# Patient Record
Sex: MALE | Race: WHITE | NOT HISPANIC OR LATINO | Employment: FULL TIME | URBAN - METROPOLITAN AREA
[De-identification: names, ages, dates, MRNs, and addresses within clinical notes are randomized per-mention and may not be internally consistent; named-entity substitution may affect disease eponyms.]

---

## 2024-01-04 ENCOUNTER — OFFICE VISIT (OUTPATIENT)
Dept: INTERNAL MEDICINE | Facility: CLINIC | Age: 24
End: 2024-01-04
Payer: COMMERCIAL

## 2024-01-04 VITALS
HEART RATE: 69 BPM | BODY MASS INDEX: 25.19 KG/M2 | SYSTOLIC BLOOD PRESSURE: 132 MMHG | DIASTOLIC BLOOD PRESSURE: 75 MMHG | HEIGHT: 74 IN | WEIGHT: 196.3 LBS | OXYGEN SATURATION: 100 %

## 2024-01-04 DIAGNOSIS — F90.2 ATTENTION DEFICIT HYPERACTIVITY DISORDER (ADHD), COMBINED TYPE: Primary | ICD-10-CM

## 2024-01-04 PROCEDURE — 99203 OFFICE O/P NEW LOW 30 MIN: CPT | Performed by: HOSPITALIST

## 2024-01-04 RX ORDER — DEXTROAMPHETAMINE SACCHARATE, AMPHETAMINE ASPARTATE, DEXTROAMPHETAMINE SULFATE AND AMPHETAMINE SULFATE 2.5; 2.5; 2.5; 2.5 MG/1; MG/1; MG/1; MG/1
1 TABLET ORAL
COMMUNITY
Start: 2023-11-16 | End: 2024-01-04

## 2024-01-04 RX ORDER — DEXTROAMPHETAMINE SACCHARATE, AMPHETAMINE ASPARTATE MONOHYDRATE, DEXTROAMPHETAMINE SULFATE AND AMPHETAMINE SULFATE 5; 5; 5; 5 MG/1; MG/1; MG/1; MG/1
20 CAPSULE, EXTENDED RELEASE ORAL DAILY
Qty: 30 CAPSULE | Refills: 0 | Status: SHIPPED | OUTPATIENT
Start: 2024-01-04 | End: 2024-01-27

## 2024-01-04 RX ORDER — LISDEXAMFETAMINE DIMESYLATE 40 MG/1
40 CAPSULE ORAL EVERY MORNING
COMMUNITY
Start: 2023-11-16 | End: 2024-01-04

## 2024-01-04 RX ORDER — DEXTROAMPHETAMINE SACCHARATE, AMPHETAMINE ASPARTATE, DEXTROAMPHETAMINE SULFATE AND AMPHETAMINE SULFATE 2.5; 2.5; 2.5; 2.5 MG/1; MG/1; MG/1; MG/1
10 TABLET ORAL DAILY PRN
Qty: 30 TABLET | Refills: 0 | Status: SHIPPED | OUTPATIENT
Start: 2024-01-04 | End: 2024-01-27

## 2024-01-04 NOTE — PROGRESS NOTES
Assessment/Plan  Problem List Items Addressed This Visit       Attention deficit hyperactivity disorder (ADHD), combined type - Primary     - ADAMARIS from last PCP in [place].   - Will change medication to Adderall XL 20mg daily and Adderall 10mg as needed during the day.   - Discontinue Vyvanse 40mg daily.  - Message back in 1-2 weeks if beneficial. May consider increased dose if slightly beneficial vs continuing back on Vyvanse.          Relevant Medications    amphetamine-dextroamphetamine (ADDERALL) 10 MG tablet    amphetamine-dextroamphetamine (ADDERALL XR) 20 MG 24 hr capsule       No results found for any visits on 01/04/24.    There are no preventive care reminders to display for this patient.      Subjective  Here to establish care. Moved here about 6 months ago from [place]. Was getting refills for meds from his prior PCP from [place].     Mentions when he was in college, he would visit his PCP on his breaks. Mentions he was diagnosed at age 17 for ADHD, combined type. Vyvanse was at 50mg before but now is on 40mg daily, Adderall 10mg as needed. Mentions he would like to discuss possibly. Does not sleep well but is able to get to sleep when he needs. 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 Mentions he has been on Vyvanse for about 6 years. Last 18 months has been on Adderall as needed. No current issues right now.                 Review of Systems   Constitutional:  Negative for chills and fever.   Respiratory:  Negative for shortness of breath.    Cardiovascular:  Negative for chest pain.   Gastrointestinal:  Negative for abdominal pain, constipation, diarrhea and nausea.   Genitourinary:  Negative for dysuria and frequency.   Musculoskeletal:  Negative for arthralgias.   Psychiatric/Behavioral:  Negative for agitation and mood changes.        History  Past Medical History:   Diagnosis Date    ADHD (attention deficit hyperactivity disorder), combined type        Past Surgical History:   Procedure  "Laterality Date    DRESS/DEBRID MED BURN NO ANESTH      right temple with Staph infection as child    SURGICAL PATHOLOGY EXAM      tooth    TONSILLECTOMY      XR FINGER SURGERY VANESSA LEFT      XR FOOT SURGERY VANESSA LEFT      OM at child       Family History   Problem Relation Age of Onset    Lymphoma Sister         Non Hodgkin's    Dementia Maternal Grandfather 80        Lewy body    Coronary Artery Disease Paternal Grandfather         required a stent       Social History     Tobacco Use    Smoking status: Never    Smokeless tobacco: Never   Substance Use Topics    Alcohol use: Not on file        Objective  /75 (BP Location: Right arm, Patient Position: Sitting, Cuff Size: Adult Regular)   Pulse 69   Ht 1.87 m (6' 1.62\")   Wt 89 kg (196 lb 4.8 oz)   SpO2 100%   BMI 25.46 kg/m    Vitals taken by Phillip Mendez MD    Physical Exam  Constitutional:       General: He is not in acute distress.     Appearance: He is not ill-appearing or toxic-appearing.   HENT:      Head: Normocephalic.   Eyes:      Conjunctiva/sclera: Conjunctivae normal.   Cardiovascular:      Rate and Rhythm: Regular rhythm.      Heart sounds: Normal heart sounds. No murmur heard.     No friction rub. No gallop.   Pulmonary:      Effort: Pulmonary effort is normal. No respiratory distress.      Breath sounds: Normal breath sounds. No wheezing, rhonchi or rales.   Musculoskeletal:      Right lower leg: No edema.      Left lower leg: No edema.   Skin:     General: Skin is warm and dry.      Findings: No rash.   Neurological:      Mental Status: He is alert.   Psychiatric:         Mood and Affect: Mood normal.         Thought Content: Thought content normal.         25 minutes spent on the date of the encounter doing chart review, history and exam, documentation and further activities per the note.      Return in about 2 months (around 3/4/2024).      Phillip Mendez MD  [facility] [place]    "

## 2024-01-04 NOTE — PROGRESS NOTES
Emile is a 23 year old that presents in clinic today for the following:     Chief Complaint   Patient presents with    Establish Care    Refill Request           1/4/2024    11:54 AM   Additional Questions   Roomed by Anitha Mccain   Accompanied by N/A       Screenings as of today     PHQ-2 Total Score (Adult) - Positive if 3 or more points; Administer   PHQ-9 if positive 0        Anitha Mccain at 12:03 PM on 1/4/2024

## 2024-01-04 NOTE — PATIENT INSTRUCTIONS
- ADAMARIS from last PCP in [place].   - Will change medication to Adderall XL 20mg daily and Adderall 10mg as needed during the day.   - Message back in 1-2 weeks if beneficial.   - Help to set up Mychart for patient.     Follow up again in 2 months by video or inperson.

## 2024-01-06 PROBLEM — F90.2 ATTENTION DEFICIT HYPERACTIVITY DISORDER (ADHD), COMBINED TYPE: Status: ACTIVE | Noted: 2024-01-06

## 2024-01-06 ASSESSMENT — ENCOUNTER SYMPTOMS
FREQUENCY: 0
AGITATION: 0
SHORTNESS OF BREATH: 0
FEVER: 0
ABDOMINAL PAIN: 0
ARTHRALGIAS: 0
CONSTIPATION: 0
DYSURIA: 0
DIARRHEA: 0
NAUSEA: 0
CHILLS: 0

## 2024-01-06 NOTE — ASSESSMENT & PLAN NOTE
- ADAMARIS from last PCP in [place].   - Will change medication to Adderall XL 20mg daily and Adderall 10mg as needed during the day.   - Discontinue Vyvanse 40mg daily.  - Message back in 1-2 weeks if beneficial. May consider increased dose if slightly beneficial vs continuing back on Vyvanse.

## 2024-03-10 ENCOUNTER — HEALTH MAINTENANCE LETTER (OUTPATIENT)
Age: 24
End: 2024-03-10

## 2024-03-11 ENCOUNTER — TELEPHONE (OUTPATIENT)
Dept: INTERNAL MEDICINE | Facility: CLINIC | Age: 24
End: 2024-03-11

## 2024-03-11 ENCOUNTER — VIRTUAL VISIT (OUTPATIENT)
Dept: INTERNAL MEDICINE | Facility: CLINIC | Age: 24
End: 2024-03-11
Payer: COMMERCIAL

## 2024-03-11 DIAGNOSIS — F90.2 ATTENTION DEFICIT HYPERACTIVITY DISORDER (ADHD), COMBINED TYPE: Primary | ICD-10-CM

## 2024-03-11 PROCEDURE — 99213 OFFICE O/P EST LOW 20 MIN: CPT | Mod: 95 | Performed by: HOSPITALIST

## 2024-03-11 RX ORDER — DEXTROAMPHETAMINE SACCHARATE, AMPHETAMINE ASPARTATE, DEXTROAMPHETAMINE SULFATE AND AMPHETAMINE SULFATE 2.5; 2.5; 2.5; 2.5 MG/1; MG/1; MG/1; MG/1
10 TABLET ORAL DAILY PRN
Qty: 30 TABLET | Refills: 0 | Status: SHIPPED | OUTPATIENT
Start: 2024-03-11 | End: 2024-04-29

## 2024-03-11 RX ORDER — DEXTROAMPHETAMINE SACCHARATE, AMPHETAMINE ASPARTATE, DEXTROAMPHETAMINE SULFATE AND AMPHETAMINE SULFATE 2.5; 2.5; 2.5; 2.5 MG/1; MG/1; MG/1; MG/1
10 TABLET ORAL DAILY PRN
Qty: 30 TABLET | Refills: 0 | Status: SHIPPED | OUTPATIENT
Start: 2024-05-11 | End: 2024-08-19

## 2024-03-11 RX ORDER — DEXTROAMPHETAMINE SACCHARATE, AMPHETAMINE ASPARTATE, DEXTROAMPHETAMINE SULFATE AND AMPHETAMINE SULFATE 2.5; 2.5; 2.5; 2.5 MG/1; MG/1; MG/1; MG/1
10 TABLET ORAL DAILY PRN
Qty: 30 TABLET | Refills: 0 | Status: SHIPPED | OUTPATIENT
Start: 2024-04-11 | End: 2024-08-19

## 2024-03-11 RX ORDER — DEXTROAMPHETAMINE SACCHARATE, AMPHETAMINE ASPARTATE MONOHYDRATE, DEXTROAMPHETAMINE SULFATE AND AMPHETAMINE SULFATE 5; 5; 5; 5 MG/1; MG/1; MG/1; MG/1
20 CAPSULE, EXTENDED RELEASE ORAL DAILY
Qty: 30 CAPSULE | Refills: 0 | Status: SHIPPED | OUTPATIENT
Start: 2024-03-11 | End: 2024-04-29

## 2024-03-11 RX ORDER — DEXTROAMPHETAMINE SACCHARATE, AMPHETAMINE ASPARTATE MONOHYDRATE, DEXTROAMPHETAMINE SULFATE AND AMPHETAMINE SULFATE 5; 5; 5; 5 MG/1; MG/1; MG/1; MG/1
20 CAPSULE, EXTENDED RELEASE ORAL DAILY
Qty: 30 CAPSULE | Refills: 0 | Status: SHIPPED | OUTPATIENT
Start: 2024-04-11 | End: 2024-08-19

## 2024-03-11 RX ORDER — DEXTROAMPHETAMINE SACCHARATE, AMPHETAMINE ASPARTATE MONOHYDRATE, DEXTROAMPHETAMINE SULFATE AND AMPHETAMINE SULFATE 5; 5; 5; 5 MG/1; MG/1; MG/1; MG/1
20 CAPSULE, EXTENDED RELEASE ORAL DAILY
Qty: 30 CAPSULE | Refills: 0 | Status: SHIPPED | OUTPATIENT
Start: 2024-05-11 | End: 2024-08-19

## 2024-03-11 NOTE — PROGRESS NOTES
"Emile is a 23 year old who is being evaluated via a billable video visit.      How would you like to obtain your AVS? MyChart  If the video visit is dropped, the invitation should be resent by: Text to cell phone: [phone]  Will anyone else be joining your video visit? No          Assessment & Plan   Problem List Items Addressed This Visit       Attention deficit hyperactivity disorder (ADHD), combined type - Primary     - Continued on Adderall XL 20mg daily, Adderall 10mg daily prn.              4 minutes spent by me on the date of the encounter doing chart review, history and exam, documentation and further activities per the note     BMI  Estimated body mass index is 25.46 kg/m  as calculated from the following:    Height as of 1/4/24: 1.87 m (6' 1.62\").    Weight as of 1/4/24: 89 kg (196 lb 4.8 oz).     Return in about 5 months (around 8/11/2024).      Subjective   Emile is a 23 year old, presenting for the following health issues:  RECHECK    Time in: 7:33am  Time out: 7:37am    History of Present Illness       Reason for visit:  ADHD    He eats 2-3 servings of fruits and vegetables daily.He consumes 1 sweetened beverage(s) daily.He exercises with enough effort to increase his heart rate 30 to 60 minutes per day.  He exercises with enough effort to increase his heart rate 4 days per week. He is missing 2 dose(s) of medications per week.  He is not taking prescribed medications regularly due to side effects and other.     Patient mentions he is doing well with taking Adderall 20mg XL daily and Adderall immediate release 10mg as needed. He denies any issues such has palpitations, chest pains, shortness of breath, bowel or urine changes. No mood changes while on the medication. He mentions usually taking the XL dosing at least 5 days out of the week and 10mg dose at least 4 times a week.       Review of Systems  Constitutional, neuro, ENT, endocrine, pulmonary, cardiac, gastrointestinal, genitourinary, " musculoskeletal, integument and psychiatric systems are negative, except as otherwise noted.      Objective           Vitals:  No vitals were obtained today due to virtual visit.    Physical Exam   GENERAL: alert and no distress  EYES: Eyes grossly normal to inspection.  No discharge or erythema, or obvious scleral/conjunctival abnormalities.  RESP: no respiratory distress  SKIN: Visible skin clear. No significant rash, abnormal pigmentation or lesions.  PSYCH: Appropriate affect, tone, and pace of words          Video-Visit Details    Type of service:  Video Visit     Originating Location (pt. Location): Home    Distant Location (provider location):  On-site  Platform used for Video Visit: Jacque  Signed Electronically by: Phillip Mendez MD

## 2024-03-11 NOTE — PATIENT INSTRUCTIONS
Thank you for visiting the Primary Care Center today at the [facility]! The following is some information about our clinic:     Continue current Adderall regimen.     Follow up again in 4-6 months.     Primary Care Center Frequently-Asked Questions    (1) How do I schedule appointments at the [facility]?     Primary Care--to schedule or make changes to an existing appointment, please call our primary care line at [phone].    Labs--to schedule a lab appointment at the [facility] you can use [employer] or call [phone]. If you have a [place] location that is closer to home, you can reach out to that location for scheduling options.     Imaging--if you need to schedule a CT, X-ray, MRI, ultrasound, or other imaging study you can call [phone] to schedule at the [facility] or any other [facility] [place] imaging location.     Referrals--if a referral to another specialty was ordered you can expect a phone call from their scheduling team. If you have not heard from them in a week, please call us or send us a [employer] message to check the status or get a scheduling number. Please note that this only applies to internal [facility] [place] referrals. If the referral is external you would need to contact their office for scheduling.     (2) I have a question about my visit, who do I contact?     You can call us at the primary care line at [phone] to ask questions about your visit. You can also send a secure message through [employer], which is reviewed by clinic staff. Please note that [employer] messages have a twenty-four to forty-eight business hour turnaround time and should not be used for urgent concerns.    (3) How will I get the results of my tests?    If you are signed up for [employer] all tests will be released to you within twenty-four hours of resulting. Please allow three to five days for your doctor to review your results and place a note  interpreting the results. If you do not have [employer] you will receive your results through mail seven to ten business days following the return of the tests. Please note that if there should be any urgent or concerning results that your doctor or their registered nurse will reach out to you the same day as the tests come back. If you have follow up questions about your results or would like to discuss the results in detail please schedule a follow up with your provider either in person or virtually.     (4) How do I get refills of my prescriptions?     You should always first contact your pharmacy for refills of your medications. If submitting a refill request on [employer], please be sure to submit the request only once--repeat requests can cause delays in refill. If you are requesting a NEW medication or a medication related to new symptoms you will need to schedule an appointment with a provider prior to approval. Please note: Routine medication refills have up to one to three business day turnaround whereas controlled substances refills have up to five to seven business day turnaround.    (5) I have new symptoms, what do I do?     If you are having an immediate medical emergency, you should dial 911 for assistance.   For anything urgent that needs to be seen within a few hours to one day you should visit a local urgent care for assistance.  For non-urgent symptoms that need to be seen within a few days to a week you can schedule with an available provider in primary care by going to [employer] or calling [phone].   If you are not sure how serious your symptoms are or you would like to receive medical advice you can always call [phone] to speak with a triage nurse.

## 2024-03-11 NOTE — TELEPHONE ENCOUNTER
Left Voicemail (1st Attempt) and Sent Mychart (1st Attempt) for the patient to call back and schedule the following:    Appointment type: [facility] RTN  Provider: PCP  Return date: 8/11    Additonal Notes: 5 month follow up

## 2024-03-15 ENCOUNTER — TELEPHONE (OUTPATIENT)
Dept: INTERNAL MEDICINE | Facility: CLINIC | Age: 24
End: 2024-03-15
Payer: COMMERCIAL

## 2024-03-15 NOTE — TELEPHONE ENCOUNTER
Left Voicemail (2nd Attempt) for the patient to call back and schedule the following:    Appointment type: P RETURN  Provider: PCP  Return date: 8/12

## 2024-03-15 NOTE — TELEPHONE ENCOUNTER
----- Message from Janette Cramer sent at 3/11/2024  1:08 PM CDT -----  Schedule 5 monnth follow up with PCP due 8/11    LVM and myc 3/11

## 2024-04-29 ENCOUNTER — MYC REFILL (OUTPATIENT)
Dept: INTERNAL MEDICINE | Facility: CLINIC | Age: 24
End: 2024-04-29
Payer: COMMERCIAL

## 2024-04-29 DIAGNOSIS — F90.2 ATTENTION DEFICIT HYPERACTIVITY DISORDER (ADHD), COMBINED TYPE: ICD-10-CM

## 2024-04-29 RX ORDER — DEXTROAMPHETAMINE SACCHARATE, AMPHETAMINE ASPARTATE, DEXTROAMPHETAMINE SULFATE AND AMPHETAMINE SULFATE 2.5; 2.5; 2.5; 2.5 MG/1; MG/1; MG/1; MG/1
10 TABLET ORAL DAILY PRN
Qty: 30 TABLET | Refills: 0 | Status: SHIPPED | OUTPATIENT
Start: 2024-04-29 | End: 2024-08-19

## 2024-04-29 RX ORDER — DEXTROAMPHETAMINE SACCHARATE, AMPHETAMINE ASPARTATE MONOHYDRATE, DEXTROAMPHETAMINE SULFATE AND AMPHETAMINE SULFATE 5; 5; 5; 5 MG/1; MG/1; MG/1; MG/1
20 CAPSULE, EXTENDED RELEASE ORAL DAILY
Qty: 30 CAPSULE | Refills: 0 | Status: SHIPPED | OUTPATIENT
Start: 2024-04-29 | End: 2024-08-19

## 2024-04-29 NOTE — TELEPHONE ENCOUNTER
Covering for PCP/ordering provider (out of clinic today):  One refill signed.  Further refills per PCP.    Thanks,  Jarad Saxena MD

## 2024-05-31 ENCOUNTER — MYC REFILL (OUTPATIENT)
Dept: INTERNAL MEDICINE | Facility: CLINIC | Age: 24
End: 2024-05-31
Payer: COMMERCIAL

## 2024-05-31 DIAGNOSIS — F90.2 ATTENTION DEFICIT HYPERACTIVITY DISORDER (ADHD), COMBINED TYPE: ICD-10-CM

## 2024-05-31 RX ORDER — DEXTROAMPHETAMINE SACCHARATE, AMPHETAMINE ASPARTATE, DEXTROAMPHETAMINE SULFATE AND AMPHETAMINE SULFATE 2.5; 2.5; 2.5; 2.5 MG/1; MG/1; MG/1; MG/1
10 TABLET ORAL DAILY PRN
Qty: 30 TABLET | Refills: 0 | Status: CANCELLED | OUTPATIENT
Start: 2024-05-31

## 2024-05-31 RX ORDER — DEXTROAMPHETAMINE SACCHARATE, AMPHETAMINE ASPARTATE MONOHYDRATE, DEXTROAMPHETAMINE SULFATE AND AMPHETAMINE SULFATE 5; 5; 5; 5 MG/1; MG/1; MG/1; MG/1
20 CAPSULE, EXTENDED RELEASE ORAL DAILY
Qty: 30 CAPSULE | Refills: 0 | Status: CANCELLED | OUTPATIENT
Start: 2024-05-31

## 2024-06-03 ENCOUNTER — MYC REFILL (OUTPATIENT)
Dept: INTERNAL MEDICINE | Facility: CLINIC | Age: 24
End: 2024-06-03
Payer: COMMERCIAL

## 2024-06-03 DIAGNOSIS — F90.2 ATTENTION DEFICIT HYPERACTIVITY DISORDER (ADHD), COMBINED TYPE: ICD-10-CM

## 2024-06-03 RX ORDER — DEXTROAMPHETAMINE SACCHARATE, AMPHETAMINE ASPARTATE, DEXTROAMPHETAMINE SULFATE AND AMPHETAMINE SULFATE 2.5; 2.5; 2.5; 2.5 MG/1; MG/1; MG/1; MG/1
10 TABLET ORAL DAILY PRN
Qty: 30 TABLET | Refills: 0 | Status: CANCELLED | OUTPATIENT
Start: 2024-06-03

## 2024-06-03 RX ORDER — DEXTROAMPHETAMINE SACCHARATE, AMPHETAMINE ASPARTATE MONOHYDRATE, DEXTROAMPHETAMINE SULFATE AND AMPHETAMINE SULFATE 5; 5; 5; 5 MG/1; MG/1; MG/1; MG/1
20 CAPSULE, EXTENDED RELEASE ORAL DAILY
Qty: 30 CAPSULE | Refills: 0 | Status: CANCELLED | OUTPATIENT
Start: 2024-06-03

## 2024-06-04 ENCOUNTER — MYC MEDICAL ADVICE (OUTPATIENT)
Dept: INTERNAL MEDICINE | Facility: CLINIC | Age: 24
End: 2024-06-04
Payer: COMMERCIAL

## 2024-08-19 ENCOUNTER — MYC REFILL (OUTPATIENT)
Dept: INTERNAL MEDICINE | Facility: CLINIC | Age: 24
End: 2024-08-19
Payer: COMMERCIAL

## 2024-08-19 DIAGNOSIS — F90.2 ATTENTION DEFICIT HYPERACTIVITY DISORDER (ADHD), COMBINED TYPE: ICD-10-CM

## 2024-08-19 RX ORDER — DEXTROAMPHETAMINE SACCHARATE, AMPHETAMINE ASPARTATE, DEXTROAMPHETAMINE SULFATE AND AMPHETAMINE SULFATE 2.5; 2.5; 2.5; 2.5 MG/1; MG/1; MG/1; MG/1
10 TABLET ORAL DAILY PRN
Qty: 30 TABLET | Refills: 0 | Status: SHIPPED | OUTPATIENT
Start: 2024-08-19

## 2024-08-19 RX ORDER — DEXTROAMPHETAMINE SACCHARATE, AMPHETAMINE ASPARTATE MONOHYDRATE, DEXTROAMPHETAMINE SULFATE AND AMPHETAMINE SULFATE 5; 5; 5; 5 MG/1; MG/1; MG/1; MG/1
20 CAPSULE, EXTENDED RELEASE ORAL DAILY
Qty: 30 CAPSULE | Refills: 0 | Status: SHIPPED | OUTPATIENT
Start: 2024-08-19

## 2024-11-17 ENCOUNTER — MYC REFILL (OUTPATIENT)
Dept: INTERNAL MEDICINE | Facility: CLINIC | Age: 24
End: 2024-11-17
Payer: COMMERCIAL

## 2024-11-17 DIAGNOSIS — F90.2 ATTENTION DEFICIT HYPERACTIVITY DISORDER (ADHD), COMBINED TYPE: ICD-10-CM

## 2024-11-17 RX ORDER — DEXTROAMPHETAMINE SACCHARATE, AMPHETAMINE ASPARTATE MONOHYDRATE, DEXTROAMPHETAMINE SULFATE AND AMPHETAMINE SULFATE 5; 5; 5; 5 MG/1; MG/1; MG/1; MG/1
20 CAPSULE, EXTENDED RELEASE ORAL DAILY
Qty: 30 CAPSULE | Refills: 0 | Status: CANCELLED | OUTPATIENT
Start: 2024-11-17

## 2024-11-18 RX ORDER — DEXTROAMPHETAMINE SACCHARATE, AMPHETAMINE ASPARTATE, DEXTROAMPHETAMINE SULFATE AND AMPHETAMINE SULFATE 2.5; 2.5; 2.5; 2.5 MG/1; MG/1; MG/1; MG/1
10 TABLET ORAL DAILY PRN
Qty: 30 TABLET | Refills: 0 | Status: SHIPPED | OUTPATIENT
Start: 2024-11-18

## 2024-11-18 RX ORDER — DEXTROAMPHETAMINE SACCHARATE, AMPHETAMINE ASPARTATE, DEXTROAMPHETAMINE SULFATE AND AMPHETAMINE SULFATE 2.5; 2.5; 2.5; 2.5 MG/1; MG/1; MG/1; MG/1
10 TABLET ORAL 2 TIMES DAILY
Qty: 90 TABLET | Refills: 0 | Status: SHIPPED | OUTPATIENT
Start: 2024-11-18

## 2024-12-13 ENCOUNTER — LAB (OUTPATIENT)
Dept: LAB | Facility: CLINIC | Age: 24
End: 2024-12-13
Payer: COMMERCIAL

## 2024-12-13 DIAGNOSIS — Z13.6 CARDIOVASCULAR SCREENING; LDL GOAL LESS THAN 160: ICD-10-CM

## 2024-12-13 LAB
ALBUMIN SERPL BCG-MCNC: 4.7 G/DL (ref 3.5–5.2)
ALP SERPL-CCNC: 66 U/L (ref 40–150)
ALT SERPL W P-5'-P-CCNC: 37 U/L (ref 0–70)
ANION GAP SERPL CALCULATED.3IONS-SCNC: 10 MMOL/L (ref 7–15)
AST SERPL W P-5'-P-CCNC: 28 U/L (ref 0–45)
BILIRUB SERPL-MCNC: 0.6 MG/DL
BUN SERPL-MCNC: 18.3 MG/DL (ref 6–20)
CALCIUM SERPL-MCNC: 9.8 MG/DL (ref 8.8–10.4)
CHLORIDE SERPL-SCNC: 103 MMOL/L (ref 98–107)
CHOLEST SERPL-MCNC: 180 MG/DL
CREAT SERPL-MCNC: 1.08 MG/DL (ref 0.67–1.17)
EGFRCR SERPLBLD CKD-EPI 2021: >90 ML/MIN/1.73M2
FASTING STATUS PATIENT QL REPORTED: YES
FASTING STATUS PATIENT QL REPORTED: YES
GLUCOSE SERPL-MCNC: 92 MG/DL (ref 70–99)
HCO3 SERPL-SCNC: 27 MMOL/L (ref 22–29)
HDLC SERPL-MCNC: 46 MG/DL
LDLC SERPL CALC-MCNC: 116 MG/DL
NONHDLC SERPL-MCNC: 134 MG/DL
POTASSIUM SERPL-SCNC: 4.6 MMOL/L (ref 3.4–5.3)
PROT SERPL-MCNC: 7.4 G/DL (ref 6.4–8.3)
SODIUM SERPL-SCNC: 140 MMOL/L (ref 135–145)
TRIGL SERPL-MCNC: 91 MG/DL

## 2024-12-13 PROCEDURE — 80061 LIPID PANEL: CPT | Performed by: PATHOLOGY

## 2024-12-13 PROCEDURE — 36415 COLL VENOUS BLD VENIPUNCTURE: CPT | Performed by: PATHOLOGY

## 2024-12-13 PROCEDURE — 80053 COMPREHEN METABOLIC PANEL: CPT | Performed by: PATHOLOGY

## 2025-01-13 ENCOUNTER — MYC REFILL (OUTPATIENT)
Dept: INTERNAL MEDICINE | Facility: CLINIC | Age: 25
End: 2025-01-13
Payer: COMMERCIAL

## 2025-01-13 DIAGNOSIS — F90.2 ATTENTION DEFICIT HYPERACTIVITY DISORDER (ADHD), COMBINED TYPE: ICD-10-CM

## 2025-01-13 RX ORDER — DEXTROAMPHETAMINE SACCHARATE, AMPHETAMINE ASPARTATE, DEXTROAMPHETAMINE SULFATE AND AMPHETAMINE SULFATE 2.5; 2.5; 2.5; 2.5 MG/1; MG/1; MG/1; MG/1
10 TABLET ORAL DAILY PRN
Qty: 30 TABLET | Refills: 0 | Status: SHIPPED | OUTPATIENT
Start: 2025-01-13

## 2025-01-13 RX ORDER — DEXTROAMPHETAMINE SACCHARATE, AMPHETAMINE ASPARTATE MONOHYDRATE, DEXTROAMPHETAMINE SULFATE AND AMPHETAMINE SULFATE 5; 5; 5; 5 MG/1; MG/1; MG/1; MG/1
20 CAPSULE, EXTENDED RELEASE ORAL DAILY
Qty: 30 CAPSULE | Refills: 0 | Status: SHIPPED | OUTPATIENT
Start: 2025-01-13

## 2025-01-13 NOTE — TELEPHONE ENCOUNTER
Controlled substance refill request notes    Refill request received for: Dextroamp-Amphet Er 20 Mg Cap  [place]  data reviewed 01/13/25:  Medication last refill: 30 tab, 30 day supply, filled/sold to patient on 12/10/2024  Pended order: Dextroamp-Amphet Er 20 Mg Cap, 30 tab, 30 day supply, no delay in fill date     Primary care provider: Phillip Mendez  Last office visit this department: 12/5/2024  Last virtual visit this department: 3/11/2024  Next appointment with PCP: 06/09/2025    Refill request forwarded to provider for review.     Opal CHAKRABORTY LPN  [facility]

## 2025-03-31 ENCOUNTER — MYC REFILL (OUTPATIENT)
Dept: INTERNAL MEDICINE | Facility: CLINIC | Age: 25
End: 2025-03-31
Payer: COMMERCIAL

## 2025-03-31 DIAGNOSIS — F90.2 ATTENTION DEFICIT HYPERACTIVITY DISORDER (ADHD), COMBINED TYPE: ICD-10-CM

## 2025-03-31 NOTE — TELEPHONE ENCOUNTER
Controlled substance refill request notes    Refill request received for: Dextroamp-Amphetamin 10 Mg Tab  [place] reviewed 03/31/25:  Medication last refill: 30 tab, 30 day supply, filled/sold to patient on 02/26/2025  Pended order: Dextroamp-Amphetamin 10 Mg Tab, 30 tab, 30 day supply, no delay in fill date     Primary care provider: Phillip Mendez  Last office visit this department: 12/5/2024  Last virtual visit this department: 3/11/2024  Next appointment with PCP:   Future Appointments 3/31/2025 - 9/27/2025        Date Visit Type Length Department Provider     6/9/2025  8:30 AM UMP RETURN 30 min [facility] Phillip Mendez MD    Location Instructions:     Due to [place] [address], travel times to this location may be longer than usual. Please plan for extra travel time and check the [place] Department of Transportation [address] [place] website for delay, closure, and detour information.  The [facility] ([facility]) is in a dense urban area with multiple transportation and parking options. You may wish to review options for  service and self-parking in more detail on the [facility] s website at [email or web address].                   Refill request forwarded to provider for review.     Opal CHAKRABORTY LPN  [facility]

## 2025-04-01 RX ORDER — DEXTROAMPHETAMINE SACCHARATE, AMPHETAMINE ASPARTATE, DEXTROAMPHETAMINE SULFATE AND AMPHETAMINE SULFATE 2.5; 2.5; 2.5; 2.5 MG/1; MG/1; MG/1; MG/1
10 TABLET ORAL DAILY PRN
Qty: 30 TABLET | Refills: 0 | Status: SHIPPED | OUTPATIENT
Start: 2025-04-01

## 2025-04-01 NOTE — TELEPHONE ENCOUNTER
Covering for PCP/ordering provider (out of clinic today): Refill of 10mg sent.    Thanks,  Jarad Saxena MD

## 2025-05-07 ENCOUNTER — MYC REFILL (OUTPATIENT)
Dept: INTERNAL MEDICINE | Facility: CLINIC | Age: 25
End: 2025-05-07
Payer: COMMERCIAL

## 2025-05-07 DIAGNOSIS — F90.2 ATTENTION DEFICIT HYPERACTIVITY DISORDER (ADHD), COMBINED TYPE: ICD-10-CM

## 2025-05-07 RX ORDER — DEXTROAMPHETAMINE SACCHARATE, AMPHETAMINE ASPARTATE MONOHYDRATE, DEXTROAMPHETAMINE SULFATE AND AMPHETAMINE SULFATE 5; 5; 5; 5 MG/1; MG/1; MG/1; MG/1
20 CAPSULE, EXTENDED RELEASE ORAL DAILY
Qty: 30 CAPSULE | Refills: 0 | Status: SHIPPED | OUTPATIENT
Start: 2025-05-07

## 2025-05-07 NOTE — TELEPHONE ENCOUNTER
Controlled substance refill request notes    Refill request received for: Dextroamp-Amphet Er 20 Mg Cap  [place] reviewed 05/07/25:  Medication last refill: qty 30, 30 day supply, filled/sold to patient on 04/02/2025  Pended order: Dextroamp-Amphet Er 20 Mg Cap, qty 30, 30 day supply, no delay in fill date     Primary care provider: Phillip Mendez  Last office visit with this department: 12/5/2024  Last virtual visit with this department: 3/11/2024  Next appointment with PCP:   Future Appointments 5/7/2025 - 11/3/2025        Date Visit Type Length Department Provider     6/9/2025  8:30 AM [facility] RETURN 30 min [facility] Phillip Mendez MD    Location Instructions:     Due to road [place] on [address], travel times to this location may be longer than usual. Please plan for extra travel time and check the [place] [employer] [address] [place] website for delay, closure, and detour information.  [facility] a dense urban area with multiple transportation and parking options. You may wish to review options for  service and self-parking in more detail on the [facility] s website at [email or web address].                     Refill request forwarded to provider for review.     Opal CHAKRABORTY LPN  [facility]

## 2025-06-09 ENCOUNTER — OFFICE VISIT (OUTPATIENT)
Dept: INTERNAL MEDICINE | Facility: CLINIC | Age: 25
End: 2025-06-09
Payer: COMMERCIAL

## 2025-06-09 VITALS
HEART RATE: 61 BPM | TEMPERATURE: 97.7 F | DIASTOLIC BLOOD PRESSURE: 72 MMHG | SYSTOLIC BLOOD PRESSURE: 122 MMHG | HEIGHT: 74 IN | OXYGEN SATURATION: 100 % | BODY MASS INDEX: 24.63 KG/M2 | RESPIRATION RATE: 16 BRPM | WEIGHT: 191.9 LBS

## 2025-06-09 DIAGNOSIS — Z23 NEED FOR TDAP VACCINATION: ICD-10-CM

## 2025-06-09 DIAGNOSIS — L72.9 SUBCUTANEOUS CYST: ICD-10-CM

## 2025-06-09 DIAGNOSIS — F90.2 ATTENTION DEFICIT HYPERACTIVITY DISORDER (ADHD), COMBINED TYPE: Primary | ICD-10-CM

## 2025-06-09 PROCEDURE — 3074F SYST BP LT 130 MM HG: CPT | Performed by: HOSPITALIST

## 2025-06-09 PROCEDURE — 99214 OFFICE O/P EST MOD 30 MIN: CPT | Performed by: HOSPITALIST

## 2025-06-09 PROCEDURE — 3078F DIAST BP <80 MM HG: CPT | Performed by: HOSPITALIST

## 2025-06-09 RX ORDER — DEXTROAMPHETAMINE SACCHARATE, AMPHETAMINE ASPARTATE MONOHYDRATE, DEXTROAMPHETAMINE SULFATE AND AMPHETAMINE SULFATE 5; 5; 5; 5 MG/1; MG/1; MG/1; MG/1
20 CAPSULE, EXTENDED RELEASE ORAL DAILY
Qty: 30 CAPSULE | Refills: 0 | Status: SHIPPED | OUTPATIENT
Start: 2025-08-08 | End: 2025-09-07

## 2025-06-09 RX ORDER — DEXTROAMPHETAMINE SACCHARATE, AMPHETAMINE ASPARTATE, DEXTROAMPHETAMINE SULFATE AND AMPHETAMINE SULFATE 2.5; 2.5; 2.5; 2.5 MG/1; MG/1; MG/1; MG/1
10 TABLET ORAL DAILY PRN
Qty: 30 TABLET | Refills: 0 | Status: SHIPPED | OUTPATIENT
Start: 2025-06-09 | End: 2025-06-09

## 2025-06-09 RX ORDER — DEXTROAMPHETAMINE SACCHARATE, AMPHETAMINE ASPARTATE MONOHYDRATE, DEXTROAMPHETAMINE SULFATE AND AMPHETAMINE SULFATE 5; 5; 5; 5 MG/1; MG/1; MG/1; MG/1
20 CAPSULE, EXTENDED RELEASE ORAL DAILY
Qty: 30 CAPSULE | Refills: 0 | Status: SHIPPED | OUTPATIENT
Start: 2025-07-09 | End: 2025-08-08

## 2025-06-09 RX ORDER — DEXTROAMPHETAMINE SACCHARATE, AMPHETAMINE ASPARTATE MONOHYDRATE, DEXTROAMPHETAMINE SULFATE AND AMPHETAMINE SULFATE 5; 5; 5; 5 MG/1; MG/1; MG/1; MG/1
20 CAPSULE, EXTENDED RELEASE ORAL DAILY
Qty: 30 CAPSULE | Refills: 0 | Status: CANCELLED | OUTPATIENT
Start: 2025-06-09

## 2025-06-09 RX ORDER — DEXTROAMPHETAMINE SACCHARATE, AMPHETAMINE ASPARTATE, DEXTROAMPHETAMINE SULFATE AND AMPHETAMINE SULFATE 2.5; 2.5; 2.5; 2.5 MG/1; MG/1; MG/1; MG/1
10 TABLET ORAL DAILY PRN
Qty: 30 TABLET | Refills: 0 | Status: CANCELLED | OUTPATIENT
Start: 2025-06-09

## 2025-06-09 RX ORDER — DEXTROAMPHETAMINE SACCHARATE, AMPHETAMINE ASPARTATE, DEXTROAMPHETAMINE SULFATE AND AMPHETAMINE SULFATE 2.5; 2.5; 2.5; 2.5 MG/1; MG/1; MG/1; MG/1
10 TABLET ORAL DAILY PRN
Qty: 30 TABLET | Refills: 0 | Status: SHIPPED | OUTPATIENT
Start: 2025-07-09 | End: 2025-08-08

## 2025-06-09 RX ORDER — DEXTROAMPHETAMINE SACCHARATE, AMPHETAMINE ASPARTATE, DEXTROAMPHETAMINE SULFATE AND AMPHETAMINE SULFATE 2.5; 2.5; 2.5; 2.5 MG/1; MG/1; MG/1; MG/1
10 TABLET ORAL DAILY PRN
Qty: 30 TABLET | Refills: 0 | Status: SHIPPED | OUTPATIENT
Start: 2025-08-08 | End: 2025-09-07

## 2025-06-09 RX ORDER — DEXTROAMPHETAMINE SACCHARATE, AMPHETAMINE ASPARTATE MONOHYDRATE, DEXTROAMPHETAMINE SULFATE AND AMPHETAMINE SULFATE 5; 5; 5; 5 MG/1; MG/1; MG/1; MG/1
20 CAPSULE, EXTENDED RELEASE ORAL DAILY
Qty: 30 CAPSULE | Refills: 0 | Status: SHIPPED | OUTPATIENT
Start: 2025-06-09 | End: 2025-07-09

## 2025-06-09 NOTE — PATIENT INSTRUCTIONS
- Recommend obtaining Tdap (has tetanus) at the pharmacy.   - Will send adderall monthly prescriptions for the next 3 months to [facility].   - Consider HIV and Hepatitis C screening in the future.     Follow up as needed.

## 2025-06-09 NOTE — PROGRESS NOTES
"  {PROVIDER CHARTING PREFERENCE:698896}    More Baptiste is a 24 year old, presenting for the following health issues:  Follow Up      6/9/2025     8:12 AM   Additional Questions   Roomed by Em     History of Present Illness       Reason for visit:  General checkup    He eats 2-3 servings of fruits and vegetables daily.He consumes 0 sweetened beverage(s) daily.He exercises with enough effort to increase his heart rate 30 to 60 minutes per day.  He exercises with enough effort to increase his heart rate 4 days per week. He is missing 2 dose(s) of medications per week.  He is not taking prescribed medications regularly due to remembering to take.        {MA/LPN/RN Pre-Provider Visit Orders- hCG/UA/Strep (Optional):807276}  {SUPERLIST (Optional):069504}  {additonal problems for provider to add (Optional):962739}    {ROS Picklists (Optional):160877}      Objective    /72 (BP Location: Right arm, Patient Position: Sitting, Cuff Size: Adult Regular)   Pulse 61   Temp 97.7  F (36.5  C) (Oral)   Resp 16   Ht 1.87 m (6' 1.62\")   Wt 87 kg (191 lb 14.4 oz)   SpO2 100%   BMI 24.89 kg/m    Body mass index is 24.89 kg/m .  Physical Exam   {Exam List (Optional):559484}    {Diagnostic Test Results (Optional):863523}        Signed Electronically by: Phillip Mendez MD  {Email feedback regarding this note to [email or web address]   :498646}  "

## 2025-06-09 NOTE — PROGRESS NOTES
Assessment & Plan   Problem List Items Addressed This Visit       Subcutaneous cyst    Located along posterior inferior neck.   - Patient to consider seeing dermatology in [place] once he moves.          Need for Tdap vaccination    - Encouraged Tdap vaccine at the pharmacy.          Attention deficit hyperactivity disorder (ADHD), combined type - Primary    - Continue Adderall XR 20mg daily and Adderall immediate release 10mg daily as needed   - Will send adderall monthly prescriptions for the next 3 months to [facility].          Relevant Medications    amphetamine-dextroamphetamine (ADDERALL XR) 20 MG 24 hr capsule    amphetamine-dextroamphetamine (ADDERALL XR) 20 MG 24 hr capsule (Start on 7/9/2025)    amphetamine-dextroamphetamine (ADDERALL XR) 20 MG 24 hr capsule (Start on 8/8/2025)    amphetamine-dextroamphetamine (ADDERALL) 10 MG tablet    amphetamine-dextroamphetamine (ADDERALL) 10 MG tablet (Start on 7/9/2025)    amphetamine-dextroamphetamine (ADDERALL) 10 MG tablet (Start on 8/8/2025)                 More Baptiste is a 24 year old, presenting for the following health issues:  Follow Up (Cyst on back needs examination)      6/9/2025     8:12 AM   Additional Questions   Roomed by Em     History of Present Illness       Reason for visit:  General checkup    He eats 2-3 servings of fruits and vegetables daily.He consumes 0 sweetened beverage(s) daily.He exercises with enough effort to increase his heart rate 30 to 60 minutes per day.  He exercises with enough effort to increase his heart rate 4 days per week. He is missing 2 dose(s) of medications per week.  He is not taking prescribed medications regularly due to remembering to take.        Mentions he is doing well and actually will be moving to [place] for a job. He starts work next week in the [employer]. Feels he is doing well with adderall. Typically takes adderall XR 20mg with 10mg immediate acting. He also may take 10mg  "immediate acting on it's own at times to help with focus. He may go without take adderall on the weekends when not needing for focus. No palpitations or swelling concerns. No issues with adderall use. He would like me to look at a cyst along his upper back. Mentions history of pilonidal cysts which currently don't give him any issues.       Review of Systems  Constitutional, neuro, ENT, endocrine, pulmonary, cardiac, gastrointestinal, genitourinary, musculoskeletal, integument and psychiatric systems are negative, except as otherwise noted.      Objective    /72 (BP Location: Right arm, Patient Position: Sitting, Cuff Size: Adult Regular)   Pulse 61   Temp 97.7  F (36.5  C) (Oral)   Resp 16   Ht 1.87 m (6' 1.62\")   Wt 87 kg (191 lb 14.4 oz)   SpO2 100%   BMI 24.89 kg/m    Body mass index is 24.89 kg/m .  Physical Exam   GENERAL: alert and no distress  EYES: Eyes grossly normal to inspection, and conjunctivae and sclerae normal  RESP: lungs clear to auscultation - no rales, rhonchi or wheezes  CV: regular rate and rhythm, normal S1 S2, no S3 or S4, no murmur, click or rub, no peripheral edema  MS: no gross musculoskeletal defects noted, no edema  SKIN: no suspicious lesions or rashes. Subcutanous cyst along posterior inferior neck region.   NEURO: Normal strength and tone, mentation intact and speech normal  PSYCH: mentation appears normal, affect normal/bright            Signed Electronically by: Phillip Mendez MD    "

## 2025-06-09 NOTE — ASSESSMENT & PLAN NOTE
Located along posterior inferior neck.   - Patient to consider seeing dermatology in [place] once he moves.

## 2025-06-09 NOTE — ASSESSMENT & PLAN NOTE
- Continue Adderall XR 20mg daily and Adderall immediate release 10mg daily as needed   - Will send adderall monthly prescriptions for the next 3 months to [facility].